# Patient Record
Sex: MALE | URBAN - METROPOLITAN AREA
[De-identification: names, ages, dates, MRNs, and addresses within clinical notes are randomized per-mention and may not be internally consistent; named-entity substitution may affect disease eponyms.]

---

## 2017-06-05 ENCOUNTER — TELEPHONE (OUTPATIENT)
Dept: ORTHOPEDIC SURGERY | Age: 57
End: 2017-06-05

## 2017-07-05 ENCOUNTER — OFFICE VISIT (OUTPATIENT)
Dept: ORTHOPEDIC SURGERY | Age: 57
End: 2017-07-05

## 2017-07-05 VITALS
SYSTOLIC BLOOD PRESSURE: 150 MMHG | WEIGHT: 185 LBS | HEIGHT: 73 IN | HEART RATE: 119 BPM | DIASTOLIC BLOOD PRESSURE: 90 MMHG | BODY MASS INDEX: 24.52 KG/M2

## 2017-07-05 DIAGNOSIS — M75.41 SHOULDER IMPINGEMENT, RIGHT: Primary | ICD-10-CM

## 2017-07-05 DIAGNOSIS — M25.511 RIGHT SHOULDER PAIN, UNSPECIFIED CHRONICITY: ICD-10-CM

## 2017-07-05 PROCEDURE — 99243 OFF/OP CNSLTJ NEW/EST LOW 30: CPT | Performed by: ORTHOPAEDIC SURGERY

## 2017-07-05 RX ORDER — MELOXICAM 15 MG/1
15 TABLET ORAL DAILY
Qty: 30 TABLET | Refills: 2 | Status: SHIPPED | OUTPATIENT
Start: 2017-07-05 | End: 2017-11-06 | Stop reason: SDUPTHER

## 2017-07-21 ENCOUNTER — TELEPHONE (OUTPATIENT)
Dept: ORTHOPEDIC SURGERY | Age: 57
End: 2017-07-21

## 2017-07-31 ENCOUNTER — TELEPHONE (OUTPATIENT)
Dept: ORTHOPEDIC SURGERY | Age: 57
End: 2017-07-31

## 2017-08-09 ENCOUNTER — OFFICE VISIT (OUTPATIENT)
Dept: ORTHOPEDIC SURGERY | Age: 57
End: 2017-08-09

## 2017-08-09 VITALS
SYSTOLIC BLOOD PRESSURE: 139 MMHG | HEART RATE: 111 BPM | BODY MASS INDEX: 24.52 KG/M2 | WEIGHT: 185 LBS | DIASTOLIC BLOOD PRESSURE: 86 MMHG | HEIGHT: 73 IN

## 2017-08-09 DIAGNOSIS — M75.41 SHOULDER IMPINGEMENT, RIGHT: Primary | ICD-10-CM

## 2017-08-09 PROCEDURE — 20610 DRAIN/INJ JOINT/BURSA W/O US: CPT | Performed by: ORTHOPAEDIC SURGERY

## 2017-08-16 ENCOUNTER — TELEPHONE (OUTPATIENT)
Dept: ORTHOPEDIC SURGERY | Age: 57
End: 2017-08-16

## 2017-08-23 ENCOUNTER — TELEPHONE (OUTPATIENT)
Dept: ORTHOPEDIC SURGERY | Age: 57
End: 2017-08-23

## 2017-10-11 ENCOUNTER — TELEPHONE (OUTPATIENT)
Dept: ORTHOPEDIC SURGERY | Age: 57
End: 2017-10-11

## 2017-10-11 NOTE — TELEPHONE ENCOUNTER
PATIENT CALLED IN STATING HE NEEDS A NEW MEDCO 14 SENT IN TO [facility] IT  ON 10/5/17

## 2017-10-12 ENCOUNTER — TELEPHONE (OUTPATIENT)
Dept: ORTHOPEDIC SURGERY | Age: 57
End: 2017-10-12

## 2017-10-12 NOTE — TELEPHONE ENCOUNTER
Left patient message he needs to come into the office and see Dr Mil Velasquez or Shanna Collins before I am able to complete medco patient should be following up monthly to comply with [facility]

## 2017-10-13 ENCOUNTER — TELEPHONE (OUTPATIENT)
Dept: ORTHOPEDIC SURGERY | Age: 57
End: 2017-10-13

## 2017-10-13 NOTE — TELEPHONE ENCOUNTER
Patient called again requesting his MEDCO-14 be completed; routed call to supervisor as patient has been told numerous times that it will not be completed until his evaluation. He continues to call back and keep requesting it be completed.

## 2017-11-01 ENCOUNTER — OFFICE VISIT (OUTPATIENT)
Dept: ORTHOPEDIC SURGERY | Age: 57
End: 2017-11-01

## 2017-11-01 VITALS
WEIGHT: 185 LBS | HEIGHT: 73 IN | DIASTOLIC BLOOD PRESSURE: 98 MMHG | HEART RATE: 103 BPM | BODY MASS INDEX: 24.52 KG/M2 | SYSTOLIC BLOOD PRESSURE: 161 MMHG

## 2017-11-01 DIAGNOSIS — M75.81 ROTATOR CUFF TENDINITIS, RIGHT: Primary | ICD-10-CM

## 2017-11-01 PROCEDURE — 99213 OFFICE O/P EST LOW 20 MIN: CPT | Performed by: ORTHOPAEDIC SURGERY

## 2017-11-01 NOTE — PROGRESS NOTES
Chief Complaint   Patient presents with    Follow-up     rt shoulder         HPI: Selena Nevarez is a 65 y.o. male here today for follow-up of his right shoulder. He reports feeling approximately 80% better since receiving a subacromial injection and starting physical therapy for rotator cuff impingement. The patient states that he feels like he is headed in the right direction for sure. He still is off work as a . He states that he is able to raise his hand fully overhead without any difficulties or pain. He states when he does reach overhead with weighted objects so that this is where he still has a continuance of his pain. Patient denies any numbness or paresthesias right upper extremity. Denies any issues with the left upper extremity. When asked to localize the pain he points over the anterolateral aspect of his right shoulder. Physical Exam:  Shoulder Examination:     Inspection:  The shoulder is benign appearing with no signs of any acute trauma.     Palpation:  Tenderness over the rotator cuff.     Active Range of Motion: Active forward elevation 170° and internal rotation to the back to T10.     Passive Range of Motion:  Similar to his active range of motion.     Strength:   5 out of 5 strength with external rotation and resisted abduction in the scapular plane.     Special Tests:  Positive Yo test.    Assessment: Right shoulder rotator cuff tendinitis    Plan: Patient is doing well and headed in the right direction. We discussed the fact that he still will benefit from therapy. He states that he has no more formal physical therapy appointments left. He can continue to do his exercises at home. Patient did state that he has been off work now for the last 2-3 months and may actually end up retiring instead of going back to work after his clearance from [employer]. We'll set him up for more focused physical therapy.   He is definitely headed in the right

## 2017-11-06 RX ORDER — MELOXICAM 15 MG/1
TABLET ORAL
Qty: 30 TABLET | Refills: 2 | Status: SHIPPED | OUTPATIENT
Start: 2017-11-06 | End: 2018-02-02 | Stop reason: SDUPTHER

## 2018-02-06 RX ORDER — MELOXICAM 15 MG/1
TABLET ORAL
Qty: 30 TABLET | Refills: 2 | Status: SHIPPED | OUTPATIENT
Start: 2018-02-06